# Patient Record
Sex: FEMALE | Race: WHITE
[De-identification: names, ages, dates, MRNs, and addresses within clinical notes are randomized per-mention and may not be internally consistent; named-entity substitution may affect disease eponyms.]

---

## 2017-06-28 ENCOUNTER — HOSPITAL ENCOUNTER (OUTPATIENT)
Dept: HOSPITAL 23 - CMAM | Age: 52
Discharge: HOME | End: 2017-06-28
Attending: INTERNAL MEDICINE
Payer: COMMERCIAL

## 2017-06-28 DIAGNOSIS — R92.8: Primary | ICD-10-CM

## 2017-06-28 PROCEDURE — G0206 DX MAMMO INCL CAD UNI: HCPCS

## 2017-10-19 ENCOUNTER — OFFICE (AMBULATORY)
Dept: URBAN - METROPOLITAN AREA CLINIC 75 | Facility: CLINIC | Age: 52
End: 2017-10-19

## 2017-10-19 VITALS
DIASTOLIC BLOOD PRESSURE: 84 MMHG | HEIGHT: 68 IN | SYSTOLIC BLOOD PRESSURE: 124 MMHG | HEART RATE: 78 BPM | WEIGHT: 200 LBS

## 2017-10-19 DIAGNOSIS — K57.92 DIVERTICULITIS OF INTESTINE, PART UNSPECIFIED, WITHOUT PERFO: ICD-10-CM

## 2017-10-19 DIAGNOSIS — K76.0 FATTY (CHANGE OF) LIVER, NOT ELSEWHERE CLASSIFIED: ICD-10-CM

## 2017-10-19 DIAGNOSIS — R10.9 UNSPECIFIED ABDOMINAL PAIN: ICD-10-CM

## 2017-10-19 DIAGNOSIS — Z72.0 TOBACCO USE: ICD-10-CM

## 2017-10-19 DIAGNOSIS — R94.5 ABNORMAL RESULTS OF LIVER FUNCTION STUDIES: ICD-10-CM

## 2017-10-19 PROCEDURE — 99203 OFFICE O/P NEW LOW 30 MIN: CPT | Performed by: INTERNAL MEDICINE

## 2018-01-09 VITALS
HEART RATE: 76 BPM | HEART RATE: 69 BPM | TEMPERATURE: 97.9 F | OXYGEN SATURATION: 93 % | DIASTOLIC BLOOD PRESSURE: 95 MMHG | SYSTOLIC BLOOD PRESSURE: 168 MMHG | TEMPERATURE: 98.1 F | OXYGEN SATURATION: 94 % | SYSTOLIC BLOOD PRESSURE: 183 MMHG | RESPIRATION RATE: 25 BRPM | SYSTOLIC BLOOD PRESSURE: 155 MMHG | DIASTOLIC BLOOD PRESSURE: 77 MMHG | OXYGEN SATURATION: 95 % | RESPIRATION RATE: 18 BRPM | WEIGHT: 190 LBS | RESPIRATION RATE: 11 BRPM | SYSTOLIC BLOOD PRESSURE: 138 MMHG | SYSTOLIC BLOOD PRESSURE: 163 MMHG | HEART RATE: 77 BPM | OXYGEN SATURATION: 97 % | RESPIRATION RATE: 21 BRPM | DIASTOLIC BLOOD PRESSURE: 96 MMHG | HEART RATE: 80 BPM | HEIGHT: 68 IN | DIASTOLIC BLOOD PRESSURE: 94 MMHG | DIASTOLIC BLOOD PRESSURE: 90 MMHG | RESPIRATION RATE: 15 BRPM | DIASTOLIC BLOOD PRESSURE: 99 MMHG | HEART RATE: 71 BPM | HEART RATE: 72 BPM | OXYGEN SATURATION: 96 % | DIASTOLIC BLOOD PRESSURE: 84 MMHG | RESPIRATION RATE: 14 BRPM

## 2018-01-10 ENCOUNTER — AMBULATORY SURGICAL CENTER (AMBULATORY)
Dept: URBAN - METROPOLITAN AREA SURGERY 17 | Facility: SURGERY | Age: 53
End: 2018-01-10
Payer: COMMERCIAL

## 2018-01-10 VITALS
SYSTOLIC BLOOD PRESSURE: 176 MMHG | OXYGEN SATURATION: 97 % | RESPIRATION RATE: 15 BRPM | HEART RATE: 93 BPM | SYSTOLIC BLOOD PRESSURE: 145 MMHG | TEMPERATURE: 96.3 F | HEART RATE: 91 BPM | DIASTOLIC BLOOD PRESSURE: 126 MMHG | DIASTOLIC BLOOD PRESSURE: 111 MMHG | SYSTOLIC BLOOD PRESSURE: 181 MMHG | HEART RATE: 109 BPM | DIASTOLIC BLOOD PRESSURE: 94 MMHG | HEART RATE: 79 BPM | RESPIRATION RATE: 17 BRPM | DIASTOLIC BLOOD PRESSURE: 85 MMHG | SYSTOLIC BLOOD PRESSURE: 198 MMHG | DIASTOLIC BLOOD PRESSURE: 108 MMHG | HEART RATE: 81 BPM | HEIGHT: 68 IN | HEART RATE: 90 BPM | SYSTOLIC BLOOD PRESSURE: 175 MMHG | SYSTOLIC BLOOD PRESSURE: 169 MMHG | DIASTOLIC BLOOD PRESSURE: 102 MMHG | SYSTOLIC BLOOD PRESSURE: 146 MMHG | OXYGEN SATURATION: 96 % | SYSTOLIC BLOOD PRESSURE: 141 MMHG | RESPIRATION RATE: 16 BRPM | OXYGEN SATURATION: 95 % | RESPIRATION RATE: 18 BRPM | SYSTOLIC BLOOD PRESSURE: 178 MMHG | DIASTOLIC BLOOD PRESSURE: 97 MMHG | DIASTOLIC BLOOD PRESSURE: 122 MMHG | DIASTOLIC BLOOD PRESSURE: 93 MMHG | OXYGEN SATURATION: 98 % | HEART RATE: 85 BPM | SYSTOLIC BLOOD PRESSURE: 155 MMHG | HEART RATE: 68 BPM | DIASTOLIC BLOOD PRESSURE: 84 MMHG | WEIGHT: 188 LBS | RESPIRATION RATE: 19 BRPM | HEART RATE: 87 BPM | SYSTOLIC BLOOD PRESSURE: 196 MMHG | SYSTOLIC BLOOD PRESSURE: 200 MMHG | RESPIRATION RATE: 21 BRPM | DIASTOLIC BLOOD PRESSURE: 98 MMHG | RESPIRATION RATE: 14 BRPM | SYSTOLIC BLOOD PRESSURE: 170 MMHG | HEART RATE: 77 BPM

## 2018-01-10 DIAGNOSIS — K64.4 RESIDUAL HEMORRHOIDAL SKIN TAGS: ICD-10-CM

## 2018-01-10 DIAGNOSIS — D12.4 BENIGN NEOPLASM OF DESCENDING COLON: ICD-10-CM

## 2018-01-10 DIAGNOSIS — D12.0 BENIGN NEOPLASM OF CECUM: ICD-10-CM

## 2018-01-10 DIAGNOSIS — Z12.11 ENCOUNTER FOR SCREENING FOR MALIGNANT NEOPLASM OF COLON: ICD-10-CM

## 2018-01-10 DIAGNOSIS — K57.30 DIVERTICULOSIS OF LARGE INTESTINE WITHOUT PERFORATION OR ABS: ICD-10-CM

## 2018-01-10 PROCEDURE — 45385 COLONOSCOPY W/LESION REMOVAL: CPT | Mod: 33 | Performed by: INTERNAL MEDICINE

## 2018-01-10 RX ADMIN — PROPOFOL 100 MG: 10 INJECTION, EMULSION INTRAVENOUS at 07:53

## 2018-01-10 RX ADMIN — LIDOCAINE HYDROCHLORIDE 25 MG: 10 INJECTION, SOLUTION EPIDURAL; INFILTRATION; INTRACAUDAL; PERINEURAL at 07:53

## 2018-01-10 RX ADMIN — PROPOFOL 50 MG: 10 INJECTION, EMULSION INTRAVENOUS at 07:57

## 2018-01-10 RX ADMIN — PROPOFOL 50 MG: 10 INJECTION, EMULSION INTRAVENOUS at 07:59

## 2018-01-10 RX ADMIN — PROPOFOL 50 MG: 10 INJECTION, EMULSION INTRAVENOUS at 07:54

## 2018-01-11 ENCOUNTER — AMBULATORY SURGICAL CENTER (AMBULATORY)
Dept: URBAN - METROPOLITAN AREA SURGERY 17 | Facility: SURGERY | Age: 53
End: 2018-01-11
Payer: COMMERCIAL

## 2018-01-11 ENCOUNTER — OFFICE (AMBULATORY)
Dept: URBAN - METROPOLITAN AREA PATHOLOGY 4 | Facility: PATHOLOGY | Age: 53
End: 2018-01-11

## 2018-01-11 DIAGNOSIS — D12.0 BENIGN NEOPLASM OF CECUM: ICD-10-CM

## 2018-01-11 DIAGNOSIS — D12.4 BENIGN NEOPLASM OF DESCENDING COLON: ICD-10-CM

## 2018-01-11 DIAGNOSIS — Z12.11 ENCOUNTER FOR SCREENING FOR MALIGNANT NEOPLASM OF COLON: ICD-10-CM

## 2018-01-11 DIAGNOSIS — K57.30 DIVERTICULOSIS OF LARGE INTESTINE WITHOUT PERFORATION OR ABS: ICD-10-CM

## 2018-01-11 DIAGNOSIS — Z83.71 FAMILY HISTORY OF COLONIC POLYPS: ICD-10-CM

## 2018-01-11 DIAGNOSIS — K64.9 UNSPECIFIED HEMORRHOIDS: ICD-10-CM

## 2018-01-11 LAB
GI HISTOLOGY: A. UNSPECIFIED: (no result)
GI HISTOLOGY: B. UNSPECIFIED: (no result)
GI HISTOLOGY: PDF REPORT: (no result)

## 2018-01-11 PROCEDURE — 45385 COLONOSCOPY W/LESION REMOVAL: CPT | Mod: 33 | Performed by: INTERNAL MEDICINE

## 2018-01-11 PROCEDURE — 88305 TISSUE EXAM BY PATHOLOGIST: CPT | Performed by: INTERNAL MEDICINE

## 2018-01-11 RX ADMIN — PROPOFOL 50 MG: 10 INJECTION, EMULSION INTRAVENOUS at 10:55

## 2018-01-11 RX ADMIN — PROPOFOL 25 MG: 10 INJECTION, EMULSION INTRAVENOUS at 10:44

## 2018-01-11 RX ADMIN — PROPOFOL 50 MG: 10 INJECTION, EMULSION INTRAVENOUS at 10:58

## 2018-01-11 RX ADMIN — PROPOFOL 100 MG: 10 INJECTION, EMULSION INTRAVENOUS at 10:34

## 2018-01-11 RX ADMIN — PROPOFOL 25 MG: 10 INJECTION, EMULSION INTRAVENOUS at 10:46

## 2018-01-11 RX ADMIN — PROPOFOL 50 MG: 10 INJECTION, EMULSION INTRAVENOUS at 10:47

## 2018-01-11 RX ADMIN — PROPOFOL 50 MG: 10 INJECTION, EMULSION INTRAVENOUS at 10:52

## 2018-01-11 RX ADMIN — PROPOFOL 50 MG: 10 INJECTION, EMULSION INTRAVENOUS at 10:39

## 2018-01-11 RX ADMIN — PROPOFOL 50 MG: 10 INJECTION, EMULSION INTRAVENOUS at 10:36

## 2018-01-11 RX ADMIN — PROPOFOL 50 MG: 10 INJECTION, EMULSION INTRAVENOUS at 10:41

## 2018-01-11 RX ADMIN — LIDOCAINE HYDROCHLORIDE 25 MG: 10 INJECTION, SOLUTION EPIDURAL; INFILTRATION; INTRACAUDAL; PERINEURAL at 10:34

## 2018-01-11 NOTE — SERVICEHPINOTES
Patient is a 51 yo F with h/o depression, HTN, osteoporosis here d/t elevated liver function tests and diverticulitis. She went to the doctor because of abdominal pain. She had CT that showed diverticulosis and after treatment of this her pain improved. She can't remember which abx she got, but thinks flagyl and Levaquin sound right. She has never had colonoscopy. She denies fam hx colon ca. Her mother and father both have colon polyps diagnosed late 50s-60s she thinks. She has had elevated LFTs in the past, but they would also go down. This time they are remaining elevated. She had never been told why they were elevated in the past. Her father has alcoholic cirrhosis and HCC. She denies herbal supplement use and OTC meds other than calcium. She has gained weight over the last few years. She says about 40lbs. She drinks wine 3-4 times per week. She will have 2-3 glasses wine on those days. BRCT abd/pelvis 10/17- diffuse hepatic steatosis. uncomplicated cholelithiasis. few sigmoid diverticula. focal inflammation of the diverticulum in the descending colon. no abscess or perf. BR8/17- SMA neg, LKM neg. BEATRIS negBRHep B surf ag neg, hep B surf Ab neg, hep B core Ab neg, Hep C ab neg, hep A ab neg

## 2018-03-30 ENCOUNTER — OFFICE (AMBULATORY)
Dept: URBAN - METROPOLITAN AREA CLINIC 75 | Facility: CLINIC | Age: 53
End: 2018-03-30

## 2018-03-30 VITALS
DIASTOLIC BLOOD PRESSURE: 80 MMHG | SYSTOLIC BLOOD PRESSURE: 140 MMHG | WEIGHT: 192 LBS | HEIGHT: 68 IN | HEART RATE: 60 BPM

## 2018-03-30 DIAGNOSIS — K76.0 FATTY (CHANGE OF) LIVER, NOT ELSEWHERE CLASSIFIED: ICD-10-CM

## 2018-03-30 DIAGNOSIS — R10.32 LEFT LOWER QUADRANT PAIN: ICD-10-CM

## 2018-03-30 DIAGNOSIS — R94.5 ABNORMAL RESULTS OF LIVER FUNCTION STUDIES: ICD-10-CM

## 2018-03-30 DIAGNOSIS — R10.31 RIGHT LOWER QUADRANT PAIN: ICD-10-CM

## 2018-03-30 PROCEDURE — 99214 OFFICE O/P EST MOD 30 MIN: CPT | Performed by: INTERNAL MEDICINE

## 2024-04-05 ENCOUNTER — TRANSCRIBE ORDERS (OUTPATIENT)
Dept: ADMINISTRATIVE | Facility: HOSPITAL | Age: 59
End: 2024-04-05

## 2024-04-05 DIAGNOSIS — K76.89 OTHER SPECIFIED DISEASES OF LIVER: Primary | ICD-10-CM

## 2024-04-05 DIAGNOSIS — K74.02 HEPATIC FIBROSIS, ADVANCED FIBROSIS: ICD-10-CM
